# Patient Record
Sex: MALE | Race: AMERICAN INDIAN OR ALASKA NATIVE
[De-identification: names, ages, dates, MRNs, and addresses within clinical notes are randomized per-mention and may not be internally consistent; named-entity substitution may affect disease eponyms.]

---

## 2022-01-09 ENCOUNTER — HOSPITAL ENCOUNTER (EMERGENCY)
Dept: HOSPITAL 5 - ED | Age: 27
Discharge: HOME | End: 2022-01-09
Payer: SELF-PAY

## 2022-01-09 VITALS — SYSTOLIC BLOOD PRESSURE: 159 MMHG | DIASTOLIC BLOOD PRESSURE: 98 MMHG

## 2022-01-09 DIAGNOSIS — J45.909: ICD-10-CM

## 2022-01-09 DIAGNOSIS — N48.1: Primary | ICD-10-CM

## 2022-01-09 LAB
BACTERIA #/AREA URNS HPF: (no result) /HPF
BILIRUB UR QL STRIP: (no result)
BLOOD UR QL VISUAL: (no result)
PH UR STRIP: 6 [PH] (ref 5–7)
PROT UR STRIP-MCNC: (no result) MG/DL
RBC #/AREA URNS HPF: 1 /HPF (ref 0–6)
UROBILINOGEN UR-MCNC: < 2 MG/DL (ref ?–2)
WBC #/AREA URNS HPF: 5 /HPF (ref 0–6)

## 2022-01-09 PROCEDURE — 81001 URINALYSIS AUTO W/SCOPE: CPT

## 2022-01-09 PROCEDURE — 99283 EMERGENCY DEPT VISIT LOW MDM: CPT

## 2022-01-09 NOTE — EMERGENCY DEPARTMENT REPORT
ED Male  HPI





- General


Chief complaint: Urogenital-Male


Stated complaint: PENIS PAIN


Time Seen by Provider: 01/09/22 12:49


Source: patient


Mode of arrival: Ambulatory


Limitations: No Limitations





- History of Present Illness


Initial comments: 





This is a [age]-year-old male nontoxic, well nourished in appearance, no acute 

signs of distress presents to the ED with c/o of foreskin pain and swelling x 

several days.  Denies any penile discharge.  Denies any other symptoms or 

complaint.   Patient denies any testicular pain or swelling. Patient denies any 

penile ulcers or lesions.  Patient denies any nausea, vomiting, chest pain, 

shortness of breathe, fever, chills, headache, back pain, numbness, tingling, 

stiff neck.  Patient denies any urinary symptoms.  Patient denies any allergies 

or PMH.


-: days(s)


Location: penis


Radiation: none


Improves with: none


Worsens with: none


swelling.  denies: discharge, mass, rash, urinary retention, blood in urine, 

dysuria, fever, nausea/vomiting, incontinence





- Related Data


                                  Previous Rx's











 Medication  Instructions  Recorded  Last Taken  Type


 


Naproxen [Naprosyn] 500 mg PO BID #20 tablet 11/25/14 Unknown Rx


 


traMADoL [Ultram 50 MG tab] 50 mg PO Q6HR PRN #20 tablet 11/25/14 Unknown Rx


 


Clotrimazole 1% [Lotrimin 1%] 15 gm TP 4XD 7 Days #1 tube 01/09/22 Unknown Rx











                                    Allergies











Allergy/AdvReac Type Severity Reaction Status Date / Time


 


SEAFOOD Allergy  Swelling Uncoded 01/09/22 10:32














ED Review of Systems


ROS: 


Stated complaint: PENIS PAIN


Other details as noted in HPI





Comment: All other systems reviewed and negative


Constitutional: denies: chills, fever


Eyes: denies: eye pain, eye discharge, vision change


ENT: denies: ear pain, throat pain


Respiratory: denies: cough, shortness of breath, wheezing


Cardiovascular: denies: chest pain, palpitations


Endocrine: no symptoms reported


Gastrointestinal: denies: abdominal pain, nausea, vomiting, diarrhea, 

constipation, hematemesis, melena, hematochezia


Genitourinary: denies: urgency, dysuria, frequency, hematuria, discharge, 

testicular pain, testicular mass


Musculoskeletal: denies: back pain, joint swelling, arthralgia


Skin: denies: rash, lesions


Neurological: denies: headache, weakness, paresthesias


Psychiatric: denies: anxiety, depression


Hematological/Lymphatic: denies: easy bleeding, easy bruising





ED Past Medical Hx





- Past Medical History


Hx Asthma: Yes





- Surgical History


Past Surgical History?: No





- Social History


Smoking Status: Never Smoker


Substance Use Type: None





- Medications


Home Medications: 


                                Home Medications











 Medication  Instructions  Recorded  Confirmed  Last Taken  Type


 


Naproxen [Naprosyn] 500 mg PO BID #20 tablet 11/25/14  Unknown Rx


 


traMADoL [Ultram 50 MG tab] 50 mg PO Q6HR PRN #20 tablet 11/25/14  Unknown Rx


 


Clotrimazole 1% [Lotrimin 1%] 15 gm TP 4XD 7 Days #1 tube 01/09/22  Unknown Rx














ED Physical Exam





- General


Limitations: No Limitations


General appearance: alert, in no apparent distress





- Head


Head exam: Present: atraumatic, normocephalic





- Eye


Eye exam: Present: normal appearance





- Neck


Neck exam: Present: normal inspection, full ROM.  Absent: lymphadenopathy





- Respiratory


Respiratory exam: Absent: respiratory distress





- Cardiovascular


Cardiovascular Exam: Present: regular rate





- GI/Abdominal


GI/Abdominal exam: Present: soft, normal bowel sounds.  Absent: distended, 

tenderness, guarding, rebound, rigid, diminished bowel sounds





- Rectal


Rectal exam: Present: deferred





- 


 exam: Present: other (slight swelling to the foreskin but is retractable).  

Absent: testicular tenderness, urethral discharge, scrotal swelling, vertical 

testicular lie, circumcision


External exam: Present: normal external exam.  Absent: erythema, lesions, 

lacerations, ecchymosis, bleeding





- Extremities Exam


Extremities exam: Present: full ROM





- Back Exam


Back exam: Present: full ROM





- Neurological Exam


Neurological exam: Present: alert, oriented X3, normal gait





- Psychiatric


Psychiatric exam: Present: normal affect, normal mood





- Skin


Skin exam: Present: warm, dry, intact, normal color.  Absent: rash





ED Course





                                   Vital Signs











  01/09/22





  10:37


 


Temperature 98.8 F


 


Pulse Rate 103 H


 


Respiratory 20





Rate 


 


Blood Pressure 146/84


 


O2 Sat by Pulse 93





Oximetry 














- Reevaluation(s)


Reevaluation #1: 





01/09/22 14:25


Patient is speaking in full sentences with no signs of distress noted.





ED Medical Decision Making





- Lab Data








                                   Lab Results











  01/09/22 Range/Units





  13:20 


 


Urine Color  Yellow  (Yellow)  


 


Urine Turbidity  Clear  (Clear)  


 


Urine pH  6.0  (5.0-7.0)  


 


Ur Specific Gravity  1.029  (1.003-1.030)  


 


Urine Protein  <15 mg/dl  (Negative)  mg/dL


 


Urine Glucose (UA)  >=500  (Negative)  mg/dL


 


Urine Ketones  Tr  (Negative)  mg/dL


 


Urine Blood  Neg  (Negative)  


 


Urine Nitrite  Neg  (Negative)  


 


Urine Bilirubin  Neg  (Negative)  


 


Urine Urobilinogen  < 2.0  (<2.0)  mg/dL


 


Ur Leukocyte Esterase  Tr  (Negative)  


 


Urine WBC (Auto)  5.0  (0.0-6.0)  /HPF


 


Urine RBC (Auto)  1.0  (0.0-6.0)  /HPF


 


U Epithel Cells (Auto)  < 1.0  (0-13.0)  /HPF


 


Urine Bacteria (Auto)  1+  (Negative)  /HPF














- Medical Decision Making





26-year-old male that presents with Balanitis.  Patient is stable and was 

examined by me.  UA does not show any significant UTIs or possible STD.  Exam 

does not show any penile discharge.  Patient be discharged with topical 

antifungal.  Patient was instructed to follow-up with a primary care doctor in 

3-5 days or if symptoms worsen and continue return to emergency room as soon as 

possible.  At time of discharge, the patient does not seem toxic or ill in 

appearance.  No acute signs of distress noted.  Patient agrees to discharge 

treatment plan of care.  No further questions noted by the patient.


Critical care attestation.: 


If time is entered above; I have spent that time in minutes in the direct care 

of this critically ill patient, excluding procedure time.








ED Disposition


Clinical Impression: 


 Balanitis





Disposition: 01 HOME / SELF CARE / HOMELESS


Is pt being admited?: No


Does the pt Need Aspirin: No


Condition: Stable


Instructions:  Balanitis


Additional Instructions: 


 follow-up with a primary care doctor in 3-5 days or if symptoms worsen and 

continue return to emergency room as soon as possible. 


Prescriptions: 


Clotrimazole 1% [Lotrimin 1%] 15 gm TP 4XD 7 Days #1 tube


Referrals: 


PRIMARY MD RO [Primary Care Provider] - 3-5 Days


AURELIO SPARROW MD [Staff Physician] - 3-5 Days


Time of Disposition: 14:27